# Patient Record
Sex: FEMALE | Race: WHITE | NOT HISPANIC OR LATINO | Employment: FULL TIME | URBAN - METROPOLITAN AREA
[De-identification: names, ages, dates, MRNs, and addresses within clinical notes are randomized per-mention and may not be internally consistent; named-entity substitution may affect disease eponyms.]

---

## 2024-07-02 ENCOUNTER — HOSPITAL ENCOUNTER (EMERGENCY)
Facility: CLINIC | Age: 29
Discharge: HOME | End: 2024-07-02
Attending: EMERGENCY MEDICINE | Admitting: EMERGENCY MEDICINE

## 2024-07-02 VITALS
OXYGEN SATURATION: 99 % | HEIGHT: 62 IN | BODY MASS INDEX: 24.66 KG/M2 | RESPIRATION RATE: 16 BRPM | WEIGHT: 134 LBS | TEMPERATURE: 97.9 F | HEART RATE: 115 BPM | DIASTOLIC BLOOD PRESSURE: 73 MMHG | SYSTOLIC BLOOD PRESSURE: 135 MMHG

## 2024-07-02 DIAGNOSIS — N93.9 VAGINAL BLEEDING: ICD-10-CM

## 2024-07-02 LAB
ABO/RH(D): NORMAL
ANION GAP SERPL CALCULATED.3IONS-SCNC: 12 MMOL/L (ref 7–15)
ANTIBODY SCREEN: NEGATIVE
BUN SERPL-MCNC: 9.8 MG/DL (ref 6–20)
CALCIUM SERPL-MCNC: 9.2 MG/DL (ref 8.6–10)
CHLORIDE SERPL-SCNC: 105 MMOL/L (ref 98–107)
CREAT SERPL-MCNC: 0.58 MG/DL (ref 0.51–0.95)
DEPRECATED HCO3 PLAS-SCNC: 24 MMOL/L (ref 22–29)
EGFRCR SERPLBLD CKD-EPI 2021: >90 ML/MIN/1.73M2
ERYTHROCYTE [DISTWIDTH] IN BLOOD BY AUTOMATED COUNT: 12.5 % (ref 10–15)
GLUCOSE SERPL-MCNC: 90 MG/DL (ref 70–99)
HCG SERPL QL: NEGATIVE
HCT VFR BLD AUTO: 37.5 % (ref 35–47)
HGB BLD-MCNC: 12.5 G/DL (ref 11.7–15.7)
MCH RBC QN AUTO: 30.5 PG (ref 26.5–33)
MCHC RBC AUTO-ENTMCNC: 33.3 G/DL (ref 31.5–36.5)
MCV RBC AUTO: 92 FL (ref 78–100)
PLATELET # BLD AUTO: 351 10E3/UL (ref 150–450)
POTASSIUM SERPL-SCNC: 3.8 MMOL/L (ref 3.4–5.3)
RBC # BLD AUTO: 4.1 10E6/UL (ref 3.8–5.2)
SODIUM SERPL-SCNC: 141 MMOL/L (ref 135–145)
SPECIMEN EXPIRATION DATE: NORMAL
WBC # BLD AUTO: 10.7 10E3/UL (ref 4–11)

## 2024-07-02 PROCEDURE — 258N000003 HC RX IP 258 OP 636

## 2024-07-02 PROCEDURE — 85014 HEMATOCRIT: CPT

## 2024-07-02 PROCEDURE — 36415 COLL VENOUS BLD VENIPUNCTURE: CPT

## 2024-07-02 PROCEDURE — 86900 BLOOD TYPING SEROLOGIC ABO: CPT

## 2024-07-02 PROCEDURE — 99283 EMERGENCY DEPT VISIT LOW MDM: CPT | Mod: 25

## 2024-07-02 PROCEDURE — 84703 CHORIONIC GONADOTROPIN ASSAY: CPT

## 2024-07-02 PROCEDURE — 80048 BASIC METABOLIC PNL TOTAL CA: CPT

## 2024-07-02 PROCEDURE — 96360 HYDRATION IV INFUSION INIT: CPT

## 2024-07-02 RX ORDER — MEDROXYPROGESTERONE ACETATE 10 MG
10 TABLET ORAL DAILY
Qty: 10 TABLET | Refills: 0 | Status: SHIPPED | OUTPATIENT
Start: 2024-07-02 | End: 2024-07-12

## 2024-07-02 RX ADMIN — SODIUM CHLORIDE 1000 ML: 9 INJECTION, SOLUTION INTRAVENOUS at 15:36

## 2024-07-02 ASSESSMENT — COLUMBIA-SUICIDE SEVERITY RATING SCALE - C-SSRS
1. IN THE PAST MONTH, HAVE YOU WISHED YOU WERE DEAD OR WISHED YOU COULD GO TO SLEEP AND NOT WAKE UP?: NO
6. HAVE YOU EVER DONE ANYTHING, STARTED TO DO ANYTHING, OR PREPARED TO DO ANYTHING TO END YOUR LIFE?: NO
2. HAVE YOU ACTUALLY HAD ANY THOUGHTS OF KILLING YOURSELF IN THE PAST MONTH?: NO

## 2024-07-02 ASSESSMENT — ACTIVITIES OF DAILY LIVING (ADL)
ADLS_ACUITY_SCORE: 35
ADLS_ACUITY_SCORE: 33

## 2024-07-02 NOTE — ED PROVIDER NOTES
"Emergency Department Staff Physician Note     I had a face to face encounter with this patient seen by the Advanced Practice Provider (CB).  I have seen, examined, and discussed the patient with the CB and agree with their assessment and plan of management.    Relevant HPI:     Amada Li is a 28 year old female who presents to the Emergency Department for evaluation of vaginal bleeding.     The patient reports around noon today she noticed heavy vaginal bleeding and has been going through 1 super tampon per hour for the last 3 hours. Patient denies any clots.     I, Neeta Wick, am serving as a scribe to document services personally performed by Kiley Cisse MD, based on my observations and the provider's statements to me.   I, Kiley Cisse MD, attest that Neeta Wick was acting in a scribe capacity, has observed my performance of the services and has documented them in accordance with my direction.    ED Course:  3:28 PM I received the patient report from the CB, Gretchen Hebert. PAC. I agree with their assessment and plan of management, and I will see the patient.  3:29 PM I met with the patient to introduce myself, gather additional history, perform my initial exam, and discuss the plan.     Brief Physical Exam:  VITAL SIGNS: /73   Pulse 115   Temp 97.9  F (36.6  C) (Oral)   Resp 16   Ht 1.575 m (5' 2\")   Wt 60.8 kg (134 lb)   LMP 06/29/2024   SpO2 99%   BMI 24.51 kg/m    Generalized: does not appear toxic, interactive and in no apparent distress.   HEENT: No eye discharge or redness, no nasal drainage or bleeding.   Resp: Equal work of breathing with bilateral chest rise present.  CV: Warm extremities, regular rate.  Neuro: Interactive, no aphasia or dysarthria, no facial droop.  MSK: Moving extremities spontaneously. No focal deformity or trauma noted to extremities.  Psych: Cooperative, does not appear to be hallucinating or responding to internal stimuli.     Impression / ED Plan:  I had a " face to face encounter with this patient seen by the Advanced Practice Provider (CB).  I have seen, examined, and discussed the patient with the CB and agree with their assessment and plan of management. I personally saw the patient and performed a substantive portion of the visit including all aspects of the medical decision making.      1.  Vaginal bleeding.  Negative hCG, pelvic exam stable per CB.  hemoglobin and platelets normal.    BMP hemolyzed, no need to repeat, no concern for electrolytes or kidney function or glucose at this time.  Medroxyprogesterone x 10 days, rx supplied.  Referral to OB/GYN in system.  Discharged home.            1. Vaginal bleeding        Kiley Cisse MD  Staff Physician  [facility] Emergency Department        Kiley Cisse MD  07/02/24 4883

## 2024-07-02 NOTE — ED PROVIDER NOTES
Emergency Department Encounter   NAME: Amada Li  AGE: 28 year old female  YOB: 1995  MRN: [id number]    PCP: No primary care provider on file.  ED PROVIDER: Gretchen Hebert PA-C    Evaluation Date & Time:   7/2/2024  3:00 PM    CHIEF COMPLAINT:  Vaginal Bleeding      Impression and Plan   MDM: 28-year-old G0, P0 female presents for evaluation of vaginal bleeding.  Today is day 4 of her period.  Around noon today she started noticing heavy vaginal bleeding.  She has changed her tampon three times from noon to 3 PM.  After she changed her tampon for the second time she notes starting to feel little bit shaky.  Denies any lightheadedness, dizziness, loss of consciousness.  She is not on birth control and is sexually active.  Denies any abnormal vaginal discharge, concern for STIs, urinary symptoms, fever, chills, abdominal pain. She isn't established with any PCP or gynecology provider. On arrival here patient is tachycardic to 115.  Otherwise vitally stable and afebrile.  On exam patient is in no acute distress. Benign abdominal exam. No pain concerning for ruptured ovarian cyst or torsion. No vaginal symptoms to indicate wet prep. No concern for STIs. No urinary symptoms or flank pain concerning for UTI/pyelonephritis. We discussed plan for pelvic exam and labs here which patient is agreeable to.     No leukocytosis concerning for ongoing systemic infection/inflammatory response.  No anemia, hemoglobin 12.5. BMP normal. Negative pregnancy test. Pelvic exam showed a small amount of blood in the vaginal vault. Cervix closed without any active hemorrhage. I reassessed patient. No longer tachycardic on my exam. Hemodynamically stable. We reviewed all lab and exam findings. We discussed utilizing Provera to help with her bleeding which she is agreeable to. We reviewed use and side effects of this medication. I referred her to OB-GYN and provided her with the information to reach them. I encouraged close  OB-GYN follow up which patient is agreeable to. We discussed strict return precautions and patient was discharged home in stable condition.     I have staffed the patient with Dr. Cisse, ED physician, who will evaluate the patient and agrees with all aspects of today's care.     ED Course as of 24 1621   Tue 2024   1559 Critical lab, K 6.6 in hemolyzed sample. Being redrawn.       Medical Decision Making  Obtained supplemental history:Supplemental history obtained?: No  Reviewed external records: External records reviewed?: No  Care impacted by chronic illness:N/A  Care significantly affected by social determinants of health:Access to [employer] health insurance, unable to get in with low cost clinic until later tonight.  Did you consider but not order tests?: Work up considered but not performed and documented in chart, if applicable  Did you interpret images independently?: My independent interpretation of imaging: none  Consultation discussion with other provider:Did you involve another provider (consultant, MH, pharmacy, etc.)?: No  Discharge. I prescribed additional prescription strength medication(s) as charted. N/A.   At the conclusion of the encounter I discussed the results of all the tests and the disposition. The questions were answered. The patient or family acknowledged understanding and was agreeable with the care plan.    FINAL IMPRESSION:    ICD-10-CM    1. Vaginal bleeding  N93.9 Ob/Gyn  Referral            MEDICATIONS GIVEN IN THE EMERGENCY DEPARTMENT:  Medications   sodium chloride 0.9% BOLUS 1,000 mL (1,000 mLs Intravenous $New Bag 24 3720)         NEW PRESCRIPTIONS STARTED AT TODAY'S ED VISIT:  New Prescriptions    MEDROXYPROGESTERONE (PROVERA) 10 MG TABLET    Take 1 tablet (10 mg) by mouth daily for 10 days         HPI   Patient information was obtained from: patient   Use of Intrepreter: N/A     Amada Li is a  28 year old female with no  "pertinent history who presents to the ED by car for evaluation of vaginal bleeding.  Today is day 4 of her period.  Around noon today she noticed heavy vaginal bleeding.  She has been going through 1 super tampon per hour for the last 3 hours.  Denies any clots.  Denies any pain, abnormal vaginal discharge, vaginal pruritus, dysuria, urinary frequency, flank pain, abdominal pain.  She has never had bleeding this heavy before.  Around an hour ago, after she noticed the second tampon had been saturated so quickly she started to feel little bit shaky.  Denies any lightheadedness, dizziness, syncope. No personal or family history of bleeding disorders.       REVIEW OF SYSTEMS:  Pertinent positive and negative symptoms per HPI.       Medical History     History reviewed. No pertinent past medical history.    History reviewed. No pertinent surgical history.    History reviewed. No pertinent family history.         medroxyPROGESTERone (PROVERA) 10 MG tablet          Physical Exam     First Vitals:  Patient Vitals for the past 24 hrs:   BP Temp Temp src Pulse Resp SpO2 Height Weight   07/02/24 1500 135/73 -- -- 115 16 99 % -- --   07/02/24 1455 136/79 97.9  F (36.6  C) Oral 102 18 98 % 1.575 m (5' 2\") 60.8 kg (134 lb)       PHYSICAL EXAM:   General Appearance:  Alert, cooperative, no distress, appears stated age  HENT: Normocephalic without obvious deformity, atraumatic. Mucous membranes moist   Eyes: Conjunctiva clear, Lids normal. No discharge.   Respiratory: No distress. Lungs clear to ausculation bilaterally. No wheezes, rhonchi or stridor  Cardiovascular: Regular rate and rhythm, no murmur. Normal cap refill. No peripheral edema  GI: Abdomen soft, nontender, normal bowel sounds  : Normal external genitalia. Small amount of blood in the vaginal vault. Cervix closed. No active hemorrhage. No internal or external lacerations appreciated.  Musculoskeletal: Moving all extremities. No gross deformities  Integument: Warm, " dry, no rashes or lesions  Neurologic: Alert and orientated x3.   Psych: Normal mood and affect      Results     LAB:  All pertinent labs reviewed and interpreted  Labs Ordered and Resulted from Time of ED Arrival to Time of ED Departure   CBC WITH PLATELETS - Normal       Result Value    WBC Count 10.7      RBC Count 4.10      Hemoglobin 12.5      Hematocrit 37.5      MCV 92      MCH 30.5      MCHC 33.3      RDW 12.5      Platelet Count 351     HCG QUALITATIVE PREGNANCY - Normal    hCG Serum Qualitative Negative     BASIC METABOLIC PANEL   TYPE AND SCREEN, ADULT    ABO/RH(D) B POS      Antibody Screen Negative      SPECIMEN EXPIRATION DATE [id number]     ABO/RH TYPE AND SCREEN       RADIOLOGY:  No orders to display     PROCEDURES:  PROCEDURE: Pelvic exam   INDICATIONS: Vaginal bleeding   PROCEDURE PROVIDER: Gretchen Hebert PA-C Chaperoned by ALMA Anthony   CONSENT: Risks, benefits and alternatives were discussed with and Verbal consent was obtained from Patient.   MEDICATIONS: N/A    DETAILS: Normal external genitalia. Small amount of blood in the vaginal vault. Cervix closed. No active hemorrhage. No internal or external lacerations appreciated.    COMPLICATIONS: Patient tolerated procedure well, without complication      Gretchen Hebert PA-C   Emergency Medicine   [facility] EMERGENCY ROOM     Gretchen Hebert PA-C  07/02/24 2858

## 2024-07-02 NOTE — ED TRIAGE NOTES
Pt on period starting on Saturday with heavy bleeding today noted at noon. She is changing super tampon more than every hour since noon while at work. Denies chance of pregnancy. No lightheadedness, but reports feeling shaky and out of it. Pt alert.      Triage Assessment (Adult)       Row Name 07/02/24 1457          Triage Assessment    Airway WDL WDL        Respiratory WDL    Respiratory WDL WDL        Skin Circulation/Temperature WDL    Skin Circulation/Temperature WDL X  cool extremities        Cardiac WDL    Cardiac WDL WDL        Peripheral/Neurovascular WDL    Peripheral Neurovascular WDL WDL        Cognitive/Neuro/Behavioral WDL    Cognitive/Neuro/Behavioral WDL WDL

## 2024-07-02 NOTE — DISCHARGE INSTRUCTIONS
You are seen in the emergency department today for evaluation of heavy vaginal bleeding.  Thankfully your labs didn't show any anemia. I prescribed you a medication to take that will help with the bleeding. You will likely start bleeding again when you stop taking this medication.     I referred you to an OB-GYN. You should follow up with them (or any other OB-GYN) in the next week. [facility] will call you to coordinate your care. If you don't hear from a representative within 2 business days, please call [phone].     Return to the emergency room for severe abdominal pain, loss of consciousness, or any other concerning symptoms.